# Patient Record
Sex: FEMALE | Race: WHITE | URBAN - METROPOLITAN AREA
[De-identification: names, ages, dates, MRNs, and addresses within clinical notes are randomized per-mention and may not be internally consistent; named-entity substitution may affect disease eponyms.]

---

## 2017-09-13 ENCOUNTER — HOSPITAL ENCOUNTER (INPATIENT)
Facility: CLINIC | Age: 22
LOS: 2 days | Discharge: HOME | DRG: 885 | End: 2017-09-15
Attending: PSYCHIATRY & NEUROLOGY | Admitting: PSYCHIATRY & NEUROLOGY
Payer: COMMERCIAL

## 2017-09-13 ENCOUNTER — HOSPITAL ENCOUNTER (EMERGENCY)
Facility: CLINIC | Age: 22
Discharge: NURSING, REHAB OR LONG-TERM CARE | End: 2017-09-13
Attending: EMERGENCY MEDICINE | Admitting: EMERGENCY MEDICINE
Payer: COMMERCIAL

## 2017-09-13 VITALS
DIASTOLIC BLOOD PRESSURE: 85 MMHG | SYSTOLIC BLOOD PRESSURE: 115 MMHG | RESPIRATION RATE: 18 BRPM | OXYGEN SATURATION: 99 % | TEMPERATURE: 98.4 F | WEIGHT: 150 LBS

## 2017-09-13 DIAGNOSIS — F41.9 ANXIETY: ICD-10-CM

## 2017-09-13 DIAGNOSIS — R45.851 SUICIDAL IDEATION: ICD-10-CM

## 2017-09-13 DIAGNOSIS — F32.A DEPRESSION, UNSPECIFIED DEPRESSION TYPE: Primary | ICD-10-CM

## 2017-09-13 LAB
ALCOHOL BREATH TEST: 0 (ref 0–0.01)
AMPHETAMINES UR QL SCN: NEGATIVE
ANION GAP SERPL CALCULATED.3IONS-SCNC: 9 MMOL/L (ref 3–14)
APAP SERPL-MCNC: <2 MG/L (ref 10–20)
BARBITURATES UR QL: NEGATIVE
BASOPHILS # BLD AUTO: 0 10E9/L (ref 0–0.2)
BASOPHILS NFR BLD AUTO: 0.4 %
BENZODIAZ UR QL: NEGATIVE
BUN SERPL-MCNC: 10 MG/DL (ref 7–30)
CALCIUM SERPL-MCNC: 9.4 MG/DL (ref 8.5–10.1)
CANNABINOIDS UR QL SCN: NEGATIVE
CHLORIDE SERPL-SCNC: 105 MMOL/L (ref 94–109)
CO2 SERPL-SCNC: 25 MMOL/L (ref 20–32)
COCAINE UR QL: NEGATIVE
CREAT SERPL-MCNC: 0.89 MG/DL (ref 0.52–1.04)
DIFFERENTIAL METHOD BLD: NORMAL
EOSINOPHIL # BLD AUTO: 0.1 10E9/L (ref 0–0.7)
EOSINOPHIL NFR BLD AUTO: 0.9 %
ERYTHROCYTE [DISTWIDTH] IN BLOOD BY AUTOMATED COUNT: 13.8 % (ref 10–15)
GFR SERPL CREATININE-BSD FRML MDRD: 78 ML/MIN/1.7M2
GLUCOSE SERPL-MCNC: 89 MG/DL (ref 70–99)
HCG UR QL: NEGATIVE
HCT VFR BLD AUTO: 44.2 % (ref 35–47)
HGB BLD-MCNC: 14.7 G/DL (ref 11.7–15.7)
IMM GRANULOCYTES # BLD: 0 10E9/L (ref 0–0.4)
IMM GRANULOCYTES NFR BLD: 0.1 %
LYMPHOCYTES # BLD AUTO: 2.9 10E9/L (ref 0.8–5.3)
LYMPHOCYTES NFR BLD AUTO: 38 %
MCH RBC QN AUTO: 29.9 PG (ref 26.5–33)
MCHC RBC AUTO-ENTMCNC: 33.3 G/DL (ref 31.5–36.5)
MCV RBC AUTO: 90 FL (ref 78–100)
MONOCYTES # BLD AUTO: 0.7 10E9/L (ref 0–1.3)
MONOCYTES NFR BLD AUTO: 8.5 %
NEUTROPHILS # BLD AUTO: 4 10E9/L (ref 1.6–8.3)
NEUTROPHILS NFR BLD AUTO: 52.1 %
NRBC # BLD AUTO: 0 10*3/UL
NRBC BLD AUTO-RTO: 0 /100
OPIATES UR QL SCN: NEGATIVE
PCP UR QL SCN: NEGATIVE
PLATELET # BLD AUTO: 376 10E9/L (ref 150–450)
POTASSIUM SERPL-SCNC: 3.8 MMOL/L (ref 3.4–5.3)
RBC # BLD AUTO: 4.92 10E12/L (ref 3.8–5.2)
SALICYLATES SERPL-MCNC: <2 MG/DL
SODIUM SERPL-SCNC: 139 MMOL/L (ref 133–144)
WBC # BLD AUTO: 7.6 10E9/L (ref 4–11)

## 2017-09-13 PROCEDURE — 81025 URINE PREGNANCY TEST: CPT | Performed by: EMERGENCY MEDICINE

## 2017-09-13 PROCEDURE — 80307 DRUG TEST PRSMV CHEM ANLYZR: CPT | Performed by: EMERGENCY MEDICINE

## 2017-09-13 PROCEDURE — 12400002 ZZH R&B MH SENIOR/ADOLESCENT

## 2017-09-13 PROCEDURE — 80329 ANALGESICS NON-OPIOID 1 OR 2: CPT | Performed by: EMERGENCY MEDICINE

## 2017-09-13 PROCEDURE — 80048 BASIC METABOLIC PNL TOTAL CA: CPT | Performed by: EMERGENCY MEDICINE

## 2017-09-13 PROCEDURE — 82075 ASSAY OF BREATH ETHANOL: CPT

## 2017-09-13 PROCEDURE — 99285 EMERGENCY DEPT VISIT HI MDM: CPT | Mod: 25

## 2017-09-13 PROCEDURE — 85025 COMPLETE CBC W/AUTO DIFF WBC: CPT | Performed by: EMERGENCY MEDICINE

## 2017-09-13 PROCEDURE — 90791 PSYCH DIAGNOSTIC EVALUATION: CPT

## 2017-09-13 RX ORDER — FERROUS SULFATE 325(65) MG
TABLET ORAL
COMMUNITY

## 2017-09-13 RX ORDER — ACETAMINOPHEN 325 MG/1
650 TABLET ORAL EVERY 4 HOURS PRN
Status: DISCONTINUED | OUTPATIENT
Start: 2017-09-13 | End: 2017-09-15 | Stop reason: HOSPADM

## 2017-09-13 RX ORDER — MULTIPLE VITAMINS W/ MINERALS TAB 9MG-400MCG
1 TAB ORAL DAILY
COMMUNITY

## 2017-09-13 RX ORDER — ALUMINA, MAGNESIA, AND SIMETHICONE 2400; 2400; 240 MG/30ML; MG/30ML; MG/30ML
30 SUSPENSION ORAL EVERY 4 HOURS PRN
Status: DISCONTINUED | OUTPATIENT
Start: 2017-09-13 | End: 2017-09-15 | Stop reason: HOSPADM

## 2017-09-13 RX ORDER — BISACODYL 10 MG
10 SUPPOSITORY, RECTAL RECTAL DAILY PRN
Status: DISCONTINUED | OUTPATIENT
Start: 2017-09-13 | End: 2017-09-15 | Stop reason: HOSPADM

## 2017-09-13 RX ORDER — HYDROXYZINE HYDROCHLORIDE 25 MG/1
25-50 TABLET, FILM COATED ORAL EVERY 4 HOURS PRN
Status: DISCONTINUED | OUTPATIENT
Start: 2017-09-13 | End: 2017-09-15 | Stop reason: HOSPADM

## 2017-09-13 RX ORDER — OLANZAPINE 10 MG/2ML
5-10 INJECTION, POWDER, FOR SOLUTION INTRAMUSCULAR
Status: DISCONTINUED | OUTPATIENT
Start: 2017-09-13 | End: 2017-09-15 | Stop reason: HOSPADM

## 2017-09-13 RX ORDER — LEVONORGESTREL/ETHIN.ESTRADIOL 0.1-0.02MG
1 TABLET ORAL DAILY
COMMUNITY

## 2017-09-13 RX ORDER — TRAZODONE HYDROCHLORIDE 50 MG/1
50 TABLET, FILM COATED ORAL
Status: DISCONTINUED | OUTPATIENT
Start: 2017-09-13 | End: 2017-09-15 | Stop reason: HOSPADM

## 2017-09-13 RX ORDER — OLANZAPINE 5 MG/1
5-10 TABLET ORAL
Status: DISCONTINUED | OUTPATIENT
Start: 2017-09-13 | End: 2017-09-15 | Stop reason: HOSPADM

## 2017-09-13 ASSESSMENT — ENCOUNTER SYMPTOMS
NERVOUS/ANXIOUS: 1
HALLUCINATIONS: 0

## 2017-09-13 NOTE — IP AVS SNAPSHOT
MRN:[id number]                      After Visit Summary   9/13/2017    Brandi Jimenez    MRN: [id number]           Thank you!     Thank you for choosing [facility] for your care. Our goal is always to provide you with excellent care.        Patient Information     Date Of Birth          1995        Designated Caregiver       Most Recent Value    Caregiver    Will someone help with your care after discharge? no      About your hospital stay     You were admitted on:  September 13, 2017 You last received care in the:  Child Adolescent  Inpatient Unit    You were discharged on:  September 15, 2017       Who to Call     For medical emergencies, please call 911.  For non-urgent questions about your medical care, please call your primary care provider or clinic, None          Attending Provider     Provider Specialty    Harshil Mcwililams MD Psychiatry       Primary Care Provider    None Specified      Further instructions from your care team             Behavioral Discharge Planning and Instructions      Summary: You were admitted on 9/13/2017 to [facility] for suicidal ideation.  You were treated by Debra Naegele, APRN, CNP and discharged on 09/15/17  to Home    Health Care Follow-up Appointments:   Medication Management  As discussed, you will schedule a follow-up appointment with Dr. Villalobos for medication management. We recommend you see your provider within 7 days of discharge  Phone:  [phone]  [facility] has faxed the Discharge Summary and AVS to this provider at Fax: [phone]      Therapy Appointment   Per your current therapist you are referred to Betina Mariano for continued therapy. Please call [facility] to schedule an appointment.   Provider: Cee Mariano  Phone:[phone]      DBT Intake Appointment  Date: Wednesday 9/27/17  Time: 1pm      Provider: [place]. [address], [address] [place] [address]  Phone: [phone]  [facility] has  faxed the discharge summary and avs to this provider at Fax: [phone]     Bring your ID, insurance cards and medication list to your appointment.     Attend all scheduled appointments with your outpatient providers. Call at least 24 hours in advance if you need to reschedule an appointment to ensure continued access to your outpatient providers.   Major Treatments, Procedures and Findings: You were provided with: a psychiatric assessment, assessed for medical stability, medication evaluation and/or management, group therapy and milieu management    Symptoms to Report: feeling more aggressive, increased confusion, losing more sleep, mood getting worse or thoughts of suicide    Early warning signs can include:  increased depression or anxiety sleep disturbances increased thoughts or behaviors of suicide or self-harm     Safety and Wellness:  Take all medicines as directed.  Make no changes unless your doctor suggests them.      Follow treatment recommendations.  Refrain from alcohol and non-prescribed drugs.  If there is a concern for safety, call 911.    Resources: Mental health crisis response for your [facility] is offered 24 hours a day, 7 days a week. A trained counselor will assess your current situation, offer support and counseling and connect you with local resources. Please call  [facility] Crisis (COPE) Response - Adult [phone]    The treatment team has appreciated the opportunity to work with you. Brandi, please take care and make your recovery a daily recovery. If you have any questions or concerns our unit number is [phone].  You will be receiving a follow-up phone call within the next three days from a representative from behavioral health.  You have identified the best phone number to reach you as [phone]          Pending Results     No orders found from 9/11/2017 to 9/14/2017.            Admission Information     Date & Time Department Dept. Phone    9/13/2017 Child Adolescent   "Inpatient Unit [phone]      Your Vitals Were     Blood Pressure Pulse Temperature Respirations Height Weight    109/79 69 100.3  F (37.9  C) (Oral) 16 1.676 m (5' 6\") 67.6 kg (149 lb 0.5 oz)    BMI (Body Mass Index)                   24.05 kg/m2           [employer] Information     [employer] lets you send messages to your doctor, view your test results, renew your prescriptions, schedule appointments and more. To sign up, go to [email or web address] . Click on \"Log in\" on the left side of the screen, which will take you to the Welcome page. Then click on \"Sign up Now\" on the right side of the page.     You will be asked to enter the access code listed below, as well as some personal information. Please follow the directions to create your username and password.     Your access code is: [email or web address]  Expires: 2017  4:31 PM     Your access code will  in 90 days. If you need help or a new code, please call your [facility] clinic or [phone].        Care EveryWhere ID     This is your Care EveryWhere ID. This could be used by other organizations to access your [facility] medical records  [id number]        Equal Access to Services     JOEL CEJA : Tamika richardso Soyessica, waaxda luqadaha, qaybta kaalmada adeegyada, bonita lin. So [facility] [phone].    ATENCIÓN: Si habla español, tiene a robbins disposición servicios gratuitos de asistencia lingüística. Llame al [phone].    We comply with applicable federal civil rights laws and [place] laws. We do not discriminate on the basis of race, color, national origin, age, disability sex, sexual orientation or gender identity.               Review of your medicines      START taking        Dose / Directions    propranolol 10 MG tablet   Commonly known as:  INDERAL   Used for:  Anxiety        Dose:  10 mg   Start taking on:  2017   Take 1 tablet (10 mg) by mouth At Bedtime   Quantity:  30 tablet   Refills:  1       " sertraline 25 MG tablet   Commonly known as:  ZOLOFT   Used for:  Depression, unspecified depression type        Dose:  25 mg   Take 1 tablet (25 mg) by mouth daily   Quantity:  30 tablet   Refills:  1         CONTINUE these medicines which have NOT CHANGED        Dose / Directions    ferrous sulfate 325 (65 FE) MG tablet   Commonly known as:  IRON        Take by mouth daily (with breakfast)   Refills:  0       levonorgestrel-ethinyl estradiol 0.1-20 MG-MCG per tablet   Commonly known as:  AVIANE,ALESSE,LESSINA        Dose:  1 tablet   Take 1 tablet by mouth daily   Refills:  0       multivitamin, therapeutic with minerals Tabs tablet        Dose:  1 tablet   Take 1 tablet by mouth daily   Refills:  0       VITAMIN D (CHOLECALCIFEROL) PO        Dose:  2000 Units   Take 2,000 Units by mouth daily   Refills:  0            Where to get your medicines      These medications were sent to [facility] [address]  [address] [address]     Phone:  [phone]     propranolol 10 MG tablet    sertraline 25 MG tablet                Protect others around you: Learn how to safely use, store and throw away your medicines at www.disposemymeds.org.             Medication List: This is a list of all your medications and when to take them. Check marks below indicate your daily home schedule. Keep this list as a reference.      Medications           Morning Afternoon Evening Bedtime As Needed    ferrous sulfate 325 (65 FE) MG tablet   Commonly known as:  IRON   Take by mouth daily (with breakfast)                                levonorgestrel-ethinyl estradiol 0.1-20 MG-MCG per tablet   Commonly known as:  AVIANE,ALESSE,LESSINA   Take 1 tablet by mouth daily                                multivitamin, therapeutic with minerals Tabs tablet   Take 1 tablet by mouth daily                                propranolol 10 MG tablet   Commonly known as:  INDERAL   Take 1 tablet  (10 mg) by mouth At Bedtime   Start taking on:  9/16/2017   Last time this was given:  10 mg on 9/15/2017  9:02 AM                                sertraline 25 MG tablet   Commonly known as:  ZOLOFT   Take 1 tablet (25 mg) by mouth daily   Last time this was given:  25 mg on 9/15/2017  9:01 AM                                VITAMIN D (CHOLECALCIFEROL) PO   Take 2,000 Units by mouth daily

## 2017-09-13 NOTE — PHARMACY-ADMISSION MEDICATION HISTORY
Admission medication history interview status for the 9/13/2017  admission is complete. See EPIC admission navigator for prior to admission medications     Medication history source reliability:Good    Actions taken by pharmacist (provider contacted, etc):None     Additional medication history information not noted on PTA med list :None    Medication reconciliation/reorder completed by provider prior to medication history? No    Time spent in this activity: 20min    Prior to Admission medications    Medication Sig Last Dose Taking? Auth Provider   levonorgestrel-ethinyl estradiol (AVIANE,ALESSE,LESSINA) 0.1-20 MG-MCG per tablet Take 1 tablet by mouth daily 9/12/2017 at Unknown time Yes Unknown, Entered By History   multivitamin, therapeutic with minerals (THERA-VIT-M) TABS tablet Take 1 tablet by mouth daily 9/13/2017 at Unknown time Yes Unknown, Entered By History   VITAMIN D, CHOLECALCIFEROL, PO Take 2,000 Units by mouth daily 9/13/2017 at Unknown time Yes Unknown, Entered By History   ferrous sulfate (IRON) 325 (65 FE) MG tablet Take by mouth daily (with breakfast) 9/13/2017 at Unknown time Yes Unknown, Entered By History

## 2017-09-13 NOTE — ED PROVIDER NOTES
History     Chief Complaint:  Suicidal ideation    HPI   Brandi Jimenez is a 22 year old female who presents with suicidal ideation. The patient reports that she has been experiencing a great deal of stress recently with conflicts with friends, work trouble, roommate concerns, family illness, and losing her support system when she moved here from [place] a year and a half ago. Due to these stressors, her depression has been increasingly difficult for her and she has felt like she has no one she can relate to the last 3 weeks. She reports that she has been cutting herself for the last 2 weeks as well as having thoughts of suicide. She planned on sitting in her car with it running in order to end her life recently and is still having thoughts of doing this while here in the ED. The patient mentioned all this to her therapist earlier that she regularly sees earlier today who recombined she come here for admission. She has been on Prozac in the past, which she stopped taking as she felt this did not help her. She does have a history of a past suicide attempt by overdosing on her Prozac while in college.    Allergies:  No Known Drug Allergies     Medications:    The patient is not currently taking any prescribed medications.    Past Medical History:    The patient denies any relevant past medical history.    Past Surgical History:    History reviewed. No pertinent past surgical history.    Family History:    The patient denies any relevant family medical history.    Social History:  The patient presented to the ED alone  Marital Status:  N/A     Review of Systems   Psychiatric/Behavioral: Positive for self-injury and suicidal ideas. Negative for hallucinations. The patient is nervous/anxious.    All other systems reviewed and are negative.    Physical Exam   Vitals:  Patient Vitals for the past 24 hrs:   BP Temp Temp src Heart Rate Resp SpO2 Weight   09/13/17 1516 119/86 98.4  F (36.9  C) Oral 83 18 99 % 68 kg (150 lb)      Physical Exam  General/Appearance: appears stated age, well-groomed, appears mildly tearful  Eyes: EOMI, no scleral injection, no icterus  ENT: MMM  Neck: supple, nl ROM, no stiffness  Cardiovascular: RRR, nl S1S2, no m/r/g, 2+ pulses in all 4 extremities, cap refill <2sec  Respiratory: CTAB, good air movement throughout, no wheezes/rhonchi/rales, no increased WOB, no retractions  Back: no lesions  GI: abd soft, non-distended, nttp,  no HSM, no rebound, no guarding, nl BS  MSK: TIDWELL, good tone, no bony abnormality  Skin: warm and well-perfused, no rash, no edema, no ecchymosis, nl turgor  Neuro: GCS 15, alert and oriented, no gross focal neuro deficits  Psych: Appearance: Casually dressed, appears stated age.     Attitude: Cooperative.     Eye Contact: Fair.     Speech: Regular rate & rhythm,  low volume and tone    Psychomotor Behavior: Normal/slow/overactive    Mood: depressed    Affect: depressed    Thought Process: Intact/tangengial/flight of ideas    Thought Content: + SI. - HI. - paranoia. - hallucinations    Insight: limited    Judgment: limited    Oriented to: Person place and time.     Attention Span and Concentration: Intact     Recent and Remote Memory: Intact  Heme: no petechia, no purpura, no active bleeding    Emergency Department Course     Laboratory:  Laboratory findings were communicated with the patient who voiced understanding of the findings.  CBC: AWNL. (WBC 7.6, HGB 14.7, )   BMP: AWNL (Creatinine 0.89)  Acetaminophen level: <2  Salicylate level: <2  Drug abuse screen: Negative  Alcohol breath test (collected at 1656): 0.00    Emergency Department Course:  Nursing notes and vitals reviewed.  I performed an exam of the patient as documented above.   IV was inserted and blood was drawn for laboratory testing, results above.    I discussed the treatment plan with the patient. They expressed understanding of this plan and consented to admission. I discussed the patient with the admitting  physician from [facility], who will admit the patient to a monitored bed for further evaluation and treatment.    Impression & Plan      Medical Decision Making:  This patient is a 22-year-old female with a history of depression and previous suicidal ideation who presents today with increasing depression and active thoughts and plans to harm herself. To both DEC and she indicated a desire to try to end her life. She is willing to common to the hospital however is on health officer hold as, where she did change her mind, I don't feel she should be allowed to leave. There is no evidence that she has attempted self-harm. Acetaminophen, salicylate, anion gap are all within normal limits. There is a bed at [facility] per she will be transferred to be seen by psych.    Diagnosis:    ICD-10-CM    1. Suicidal ideation R45.851 CBC with platelets differential     Basic metabolic panel     Salicylate level     Drug abuse screen urine     Disposition:   Transfer    Scribe Disclosure:  I, Vikram Greenfield, am serving as a scribe at 4:51 PM on 9/13/2017 to document services personally performed by Narcisa Henry*, based on my observations and the provider's statements to me.'    9/13/2017    EMERGENCY DEPARTMENT       Narcisa Henry MD  09/13/17 1936

## 2017-09-14 PROCEDURE — 12400002 ZZH R&B MH SENIOR/ADOLESCENT

## 2017-09-14 PROCEDURE — 90853 GROUP PSYCHOTHERAPY: CPT

## 2017-09-14 PROCEDURE — 99222 1ST HOSP IP/OBS MODERATE 55: CPT | Mod: AI | Performed by: CLINICAL NURSE SPECIALIST

## 2017-09-14 PROCEDURE — 25000132 ZZH RX MED GY IP 250 OP 250 PS 637: Performed by: PSYCHIATRY & NEUROLOGY

## 2017-09-14 PROCEDURE — 97150 GROUP THERAPEUTIC PROCEDURES: CPT | Mod: GO

## 2017-09-14 PROCEDURE — 25000132 ZZH RX MED GY IP 250 OP 250 PS 637: Performed by: CLINICAL NURSE SPECIALIST

## 2017-09-14 RX ORDER — SERTRALINE HYDROCHLORIDE 25 MG/1
25 TABLET, FILM COATED ORAL DAILY
Status: DISCONTINUED | OUTPATIENT
Start: 2017-09-14 | End: 2017-09-15 | Stop reason: HOSPADM

## 2017-09-14 RX ADMIN — HYDROXYZINE HYDROCHLORIDE 25 MG: 25 TABLET ORAL at 18:03

## 2017-09-14 RX ADMIN — HYDROXYZINE HYDROCHLORIDE 25 MG: 25 TABLET ORAL at 10:44

## 2017-09-14 RX ADMIN — SERTRALINE HYDROCHLORIDE 25 MG: 25 TABLET ORAL at 12:48

## 2017-09-14 ASSESSMENT — ACTIVITIES OF DAILY LIVING (ADL)
DRESS: SCRUBS (BEHAVIORAL HEALTH)
ORAL_HYGIENE: INDEPENDENT
GROOMING: INDEPENDENT
LAUNDRY: WITH SUPERVISION
ORAL_HYGIENE: INDEPENDENT
HYGIENE/GROOMING: INDEPENDENT
DRESS: SCRUBS (BEHAVIORAL HEALTH);INDEPENDENT

## 2017-09-14 NOTE — ED NOTES
Report given to RN at [facility]. Patient can transfer, they would like urine pregnancy test run first.

## 2017-09-14 NOTE — PLAN OF CARE
Problem: Depressive Symptoms  Goal: Depressive Symptoms  Signs and symptoms of listed problems will be absent or manageable.    Patient, prior to discharge, will:  -verbalize decrease in depressive signs/symptoms  -verbalize a decrease in anxiety   -verbalize an understanding of medication regimen   -verbalize absence of SI/SIB   -develop a safety plan  -identify a support system   -will participate in coordination of discharge planning    To promote safety/ mental health    Patient identified the following   Triggers:    Wellness Strategies:    Warning Signs:      Feedback (people they would like to receive feedback from if early warning signs):  Friend(s)    Family(s):     Partner/Spouse:     Support Group Member(s):     Co-Worker(s):     Taking Action:    Ways to [place]:      Self-Reflection & Planning.  Assessed patient s progress completing forms related to Illness Management Recovery (including Personal Plan of Care, Adult Coping Plan, and My Support and Coping Plan) and assisted as needed.    Encouraged patient to continue to consider triggers, wellness strategies, early warning signs, feedback from others, actions to take to prevent relapse, and coping strategies as part of a plan to remain well after leaving the hospital.         Outcome: No Change    09/13/17 9877   Depressive Symptoms   Depressive Symptoms Assessed all   Depressive Symptoms Present affect;mood;anxiety;insight;thought process;speech;psychomotor activity         Patient is admitted to [facility] from [facility]. Patient and belongings are searched.  Patient is given a tour of the unit. Patient presented self to the ED with c/o worsening depression in the last three weeks.  Patient had a SI plan to commit suicide by CO2 poisoning.  Patient has a history of SA X 1 in the past.  Prescribed Prozac but discontinued the medication.  No current mental health meds.  Patient recently moved to the [place] for a job opportunity.  Patient reports  "\"issues with friends, health issues with family, lots going on at work and don't get along with roommate.\"  Patient reports isolation.  Patient support system is her mother who lives 4 hours away.  Patient denies SI or SIB now.  Reports depression 7/10, anxiety 9/10, irritability 3/10, aggitation 4/10.  Denies hallucinations or hearing voices.  Patient is hopeful with this admission and reported being here as positive. MD notified of new admit.  Orders are received.  Patient signed admit forms including a XAVI for mom.  Patient denies any questions or concerns at this time. Patient is on Status 15 for safety.  Continue to monitor.       "

## 2017-09-14 NOTE — PROGRESS NOTES
09/14/17 1300   General Information   Date Initially Attended OT 09/14/17   Clinical Impression   Affect Appropriate to situation   Orientation Oriented to person, place and time   Appearance and ADLs General cleanliness observed in most areas   Attention to Internal Stimuli No observed signs   Interaction Skills Interacts appropriately with staff   Ability to Communicate Needs [facility]   Verbal Content Appropriate to topic   Ability to Maintain Boundaries Maintains appropriate physical boundaries   Participation Initiates participation   Concentration Concentrates 50 minutes   Ability to Concentrate With structure   Follows and Comprehends Directions Independently follows 2 step verbal directions   Memory Delayed and immediate recall intact   Organization Independently organizes medium tasks   Decision Making Independent   Planning and Problem Solving Occasionally needs assist/feedback   Ability to Apply and Learn Concepts Applies within group structure   Frustrations / Stress Tolerance Independently identifies sources of frustration/stress   Level of Insight Insightful into needs, issues, goals   Self Esteem Can identify positives   Social Supports Identifies utilizing supports

## 2017-09-14 NOTE — PROGRESS NOTES
Behavioral Health  Note  Behavioral Health  Spirituality Group Note    Unit 4AW    Name: Brandi Jimenez    YOB: 1995   MRN: [id number]    Age: 22 year old    Patient attended -led group, which included discussion of spirituality, coping with illness and building resilience.  Patient attended group for 1 hrs.  The patient actively participated in group discussion    Shae Fisher M.S., M.Div.  Staff   Pager [phone]

## 2017-09-14 NOTE — PROGRESS NOTES
"Attendence: Pt. Attended scheduled 2 of 2 OT sessions today.   Observations: pt was positive and engaged throughout groups, interacting with peers and staff. Pt identified coping skills including singing and playing piano and reports wanting to gain \"more coping skills\".      09/14/17 1500   Occupational Therapy   Type of Intervention structured groups   Response Participates   Hours 2     "
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
## 2017-09-14 NOTE — PROGRESS NOTES
Pt. Stated that she was agitated but had a good day overall. Pt. Said that she  getting adjusted to how things work. Pt. Stated that she had SI/SIB thoughts but it is not anything she would act on. Pt. Did not need any redirection from staff.      09/14/17 1400   Behavioral Health   Hallucinations denies / not responding to hallucinations   Thinking poor concentration   Orientation person: oriented;place: oriented;date: oriented;time: oriented   Memory baseline memory   Insight admits / accepts   Judgement intact   Eye Contact at examiner   Affect blunted, flat   Mood mood is calm   Physical Appearance/Attire attire appropriate to age and situation   Hygiene well groomed   Suicidality thoughts only   Self Injury thoughts only   Elopement (none observed )   Activity other (see comment)  (visible in the milieu and groups )   Speech clear;coherent   Medication Sensitivity no stated side effects

## 2017-09-14 NOTE — PROGRESS NOTES
"Patient received prn hydroxyzine 25 mg at 1045 for sx of anxiety. She was also given printed information on new medication sertraline with plan to start sertraline at 25 mg daily today. She reported improvement in anxiety when rechecked after hydroxyzine given and also reported SE of being \"a little dizzy\" after medication was given. She had no questions regarding new medication sertraline.           "

## 2017-09-14 NOTE — PROGRESS NOTES
09/13/17 2154   Patient Belongings   Did you bring any home meds/supplements to the hospital?  Yes   Disposition of meds  Sent to security/pharmacy per site process   Patient Belongings clothing;jewelry;purse;shoes   Disposition of Belongings security, pt bin    Belongings Search Yes   Clothing Search Yes   Second Staff Smitha RN         Bin:  Black pants, patterned blouse, copper necklace, white iphone with blue case, black flats, black jacoby bag, gray edu, cigarettes, lighter, matches, makeup, headphones (2), car keys, , pink wallet, black wallet (insurance card, 10$ cash, birth control (Aviane))    Security ([id number]) :  [employer] Reviewed ID, [place] 's License, [employer] card ([id number])        A               Admission:  I am responsible for any personal items that are not sent to the safe or pharmacy.  [facility] is not responsible for loss, theft or damage of any property in my possession.    Signature:  _________________________________ Date: _______  Time: _____                                              Staff Signature:  ____________________________ Date: ________  Time: _____      2nd Staff person, if patient is unable/unwilling to sign:    Signature: ________________________________ Date: ________  Time: _____     Discharge:  [facility] has returned all of my personal belongings:    Signature: _________________________________ Date: ________  Time: _____                                          Staff Signature:  ____________________________ Date: ________  Time: _____

## 2017-09-15 VITALS
HEIGHT: 66 IN | BODY MASS INDEX: 23.95 KG/M2 | WEIGHT: 149.03 LBS | TEMPERATURE: 100.3 F | DIASTOLIC BLOOD PRESSURE: 79 MMHG | SYSTOLIC BLOOD PRESSURE: 109 MMHG | HEART RATE: 69 BPM | RESPIRATION RATE: 16 BRPM

## 2017-09-15 LAB
ALBUMIN SERPL-MCNC: 3.5 G/DL (ref 3.4–5)
ALP SERPL-CCNC: 35 U/L (ref 40–150)
ALT SERPL W P-5'-P-CCNC: 20 U/L (ref 0–50)
ANION GAP SERPL CALCULATED.3IONS-SCNC: 9 MMOL/L (ref 3–14)
AST SERPL W P-5'-P-CCNC: 11 U/L (ref 0–45)
BASOPHILS # BLD AUTO: 0 10E9/L (ref 0–0.2)
BASOPHILS NFR BLD AUTO: 0.5 %
BILIRUB SERPL-MCNC: 0.4 MG/DL (ref 0.2–1.3)
BUN SERPL-MCNC: 11 MG/DL (ref 7–30)
CALCIUM SERPL-MCNC: 8.5 MG/DL (ref 8.5–10.1)
CHLORIDE SERPL-SCNC: 109 MMOL/L (ref 94–109)
CHOLEST SERPL-MCNC: 182 MG/DL
CO2 SERPL-SCNC: 24 MMOL/L (ref 20–32)
CREAT SERPL-MCNC: 0.98 MG/DL (ref 0.52–1.04)
DEPRECATED CALCIDIOL+CALCIFEROL SERPL-MC: 48 UG/L (ref 20–75)
DIFFERENTIAL METHOD BLD: NORMAL
EOSINOPHIL # BLD AUTO: 0.2 10E9/L (ref 0–0.7)
EOSINOPHIL NFR BLD AUTO: 3 %
ERYTHROCYTE [DISTWIDTH] IN BLOOD BY AUTOMATED COUNT: 13.5 % (ref 10–15)
GFR SERPL CREATININE-BSD FRML MDRD: 70 ML/MIN/1.7M2
GLUCOSE SERPL-MCNC: 85 MG/DL (ref 70–99)
HCT VFR BLD AUTO: 41.6 % (ref 35–47)
HDLC SERPL-MCNC: 62 MG/DL
HGB BLD-MCNC: 13.1 G/DL (ref 11.7–15.7)
IMM GRANULOCYTES # BLD: 0 10E9/L (ref 0–0.4)
IMM GRANULOCYTES NFR BLD: 0 %
LDLC SERPL CALC-MCNC: 99 MG/DL
LYMPHOCYTES # BLD AUTO: 2.6 10E9/L (ref 0.8–5.3)
LYMPHOCYTES NFR BLD AUTO: 44.5 %
MCH RBC QN AUTO: 28.7 PG (ref 26.5–33)
MCHC RBC AUTO-ENTMCNC: 31.5 G/DL (ref 31.5–36.5)
MCV RBC AUTO: 91 FL (ref 78–100)
MONOCYTES # BLD AUTO: 0.5 10E9/L (ref 0–1.3)
MONOCYTES NFR BLD AUTO: 8.4 %
NEUTROPHILS # BLD AUTO: 2.5 10E9/L (ref 1.6–8.3)
NEUTROPHILS NFR BLD AUTO: 43.6 %
NONHDLC SERPL-MCNC: 120 MG/DL
NRBC # BLD AUTO: 0 10*3/UL
NRBC BLD AUTO-RTO: 0 /100
PLATELET # BLD AUTO: 322 10E9/L (ref 150–450)
POTASSIUM SERPL-SCNC: 4 MMOL/L (ref 3.4–5.3)
PROT SERPL-MCNC: 7.1 G/DL (ref 6.8–8.8)
RBC # BLD AUTO: 4.57 10E12/L (ref 3.8–5.2)
SODIUM SERPL-SCNC: 142 MMOL/L (ref 133–144)
TRIGL SERPL-MCNC: 105 MG/DL
TSH SERPL DL<=0.005 MIU/L-ACNC: 0.6 MU/L (ref 0.4–4)
WBC # BLD AUTO: 5.7 10E9/L (ref 4–11)

## 2017-09-15 PROCEDURE — 82306 VITAMIN D 25 HYDROXY: CPT | Performed by: PSYCHIATRY & NEUROLOGY

## 2017-09-15 PROCEDURE — 36415 COLL VENOUS BLD VENIPUNCTURE: CPT | Performed by: PSYCHIATRY & NEUROLOGY

## 2017-09-15 PROCEDURE — 80061 LIPID PANEL: CPT | Performed by: PSYCHIATRY & NEUROLOGY

## 2017-09-15 PROCEDURE — 84443 ASSAY THYROID STIM HORMONE: CPT | Performed by: PSYCHIATRY & NEUROLOGY

## 2017-09-15 PROCEDURE — 85025 COMPLETE CBC W/AUTO DIFF WBC: CPT | Performed by: PSYCHIATRY & NEUROLOGY

## 2017-09-15 PROCEDURE — 25000132 ZZH RX MED GY IP 250 OP 250 PS 637: Performed by: CLINICAL NURSE SPECIALIST

## 2017-09-15 PROCEDURE — 80053 COMPREHEN METABOLIC PANEL: CPT | Performed by: PSYCHIATRY & NEUROLOGY

## 2017-09-15 PROCEDURE — 99239 HOSP IP/OBS DSCHRG MGMT >30: CPT | Performed by: CLINICAL NURSE SPECIALIST

## 2017-09-15 PROCEDURE — 97150 GROUP THERAPEUTIC PROCEDURES: CPT | Mod: GO

## 2017-09-15 RX ORDER — PROPRANOLOL HYDROCHLORIDE 10 MG/1
10 TABLET ORAL ONCE
Status: COMPLETED | OUTPATIENT
Start: 2017-09-15 | End: 2017-09-15

## 2017-09-15 RX ORDER — PROPRANOLOL HYDROCHLORIDE 10 MG/1
10 TABLET ORAL AT BEDTIME
Qty: 30 TABLET | Refills: 1 | Status: SHIPPED | OUTPATIENT
Start: 2017-09-16

## 2017-09-15 RX ORDER — PROPRANOLOL HYDROCHLORIDE 10 MG/1
10 TABLET ORAL AT BEDTIME
Status: DISCONTINUED | OUTPATIENT
Start: 2017-09-16 | End: 2017-09-15 | Stop reason: HOSPADM

## 2017-09-15 RX ORDER — SERTRALINE HYDROCHLORIDE 25 MG/1
25 TABLET, FILM COATED ORAL DAILY
Qty: 30 TABLET | Refills: 1 | Status: SHIPPED | OUTPATIENT
Start: 2017-09-15

## 2017-09-15 RX ADMIN — SERTRALINE HYDROCHLORIDE 25 MG: 25 TABLET ORAL at 09:01

## 2017-09-15 RX ADMIN — PROPRANOLOL HYDROCHLORIDE 10 MG: 10 TABLET ORAL at 09:02

## 2017-09-15 ASSESSMENT — ACTIVITIES OF DAILY LIVING (ADL): GROOMING: INDEPENDENT

## 2017-09-15 NOTE — PROGRESS NOTES
Patient acknowledged understanding of discharge instructions and medications.  Waiting for her mother for transportation.

## 2017-09-15 NOTE — PLAN OF CARE
Problem: Depressive Symptoms  Goal: Depressive Symptoms  Signs and symptoms of listed problems will be absent or manageable.    Patient, prior to discharge, will:  -verbalize decrease in depressive signs/symptoms  -verbalize a decrease in anxiety   -verbalize an understanding of medication regimen   -verbalize absence of SI/SIB   -develop a safety plan  -identify a support system   -will participate in coordination of discharge planning    To promote safety/ mental health    Patient identified the following   Triggers:    Wellness Strategies:    Warning Signs:      Feedback (people they would like to receive feedback from if early warning signs):  Friend(s)    Family(s):     Partner/Spouse:     Support Group Member(s):     Co-Worker(s):     Taking Action:    Ways to [place]:      Self-Reflection & Planning.  Assessed patient s progress completing forms related to Illness Management Recovery (including Personal Plan of Care, Adult Coping Plan, and My Support and Coping Plan) and assisted as needed.    Encouraged patient to continue to consider triggers, wellness strategies, early warning signs, feedback from others, actions to take to prevent relapse, and coping strategies as part of a plan to remain well after leaving the hospital.         Outcome: Therapy, progress toward functional goals as expected  Patient up and visible this morning.  Affect is full range.  Pleasant and cooperative. Currently denies SI/SIB.  Describes her mood as anxious which she rates as 6-7 (10 worst).  States she is particularly anxious about next week when her mother goes back to [place].  Thinking is linear and organized.  Acknowledges that her support system is in [place]--and she plans to return in a couple of weeks.  Attended unit programming.

## 2017-09-15 NOTE — PROGRESS NOTES
"INITIAL PSYCHOSOCIAL ASSESSMENT    I have reviewed the chart and interviewed the patient.        PRESENTING PROBLEM  Per ED provider note, \"Brandi Jimenez is a 22 year old female who presents with suicidal ideation. The patient reports that she has been experiencing a great deal of stress recently with conflicts with friends, work trouble, roommate concerns, family illness, and losing her support system when she moved here from [place] a year and a half ago. Due to these stressors, her depression has been increasingly difficult for her and she has felt like she has no one she can relate to the last 3 weeks. She reports that she has been cutting herself for the last 2 weeks as well as having thoughts of suicide. She planned on sitting in her car with it running in order to end her life recently and is still having thoughts of doing this while here in the ED. The patient mentioned all this to her therapist earlier that she regularly sees earlier today who recombined she come here for admission. She has been on Prozac in the past, which she stopped taking as she felt this did not help her. She does have a history of a past suicide attempt by overdosing on her Prozac while in college\"    Patient admitted to station 4A for further psychiatric evaluation and stabilization.      Stressors  Patient is originally from [place], [place]. She has no social connections here and feels isolated.       Legal Status      Voluntary  Is patient under a civil commitment/legal guardian?  No    HISTORY OF MENTAL HEALTH  Diagnosis: The patient has a history of major depressive disorder   Current symptoms: depression, anxiety and suicidal thoughts  Past hospitalization: No  Past/current commitments: No  Hx of suicidal attempts: Yes: overdosed in college. She did not present to the hospital after the overdose so never hospitalized.   SIB:  Yes: cutting  Hx of Aggression: No  Current medications/med compliance: hx of anti depressants. Not taking " prior to admission.   Treatment History: Yes: individual therapy      CHEMICAL HEALTH HISTORY  Drug screen results:  No  History of CD Treatment:  No  Rule 25 needed:  Not applicable    SOCIAL HISTORY  Family description:  Patient is originally from [place], [place]. She reports her family is close and identifies her mother has very supportive. Patient is single and does not have any children.   Significant Life Events (Trauma):  NA  Major Illness:  No  Living Situation:  house  Criminal hx and Legal Issues: No  Ethnic/Cultural Issues:  The patient does not identify any ethnic or cultural issues that impact treatment.   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 Patient feels isolated.     CURRENT HEALTH CARE PROVIDERS  Psychiatrist: None  Therapist: Cee Urbina. [facility]. [phone]  Primary Care: Dr. Estevan Villalobos. [facility]. [phone]  : None    Strengths:  has some insight, has family support, is medically insured, has a stable living environment, has mental health providers in place, able to seek help when in crisis, is future oriented and is gainfully employed.    Vulnerabilities:  poor coping skills, lacks social support.       SOCIAL SERVICE ASSESSMENT/PLAN:       Patient will benefit from an increase in individual therapy to 1x weekly and DBT groups    CTC will consult with treatment team for additional treatment recommendations.     CTC will  schedule appointments with outpatient providers for follow-up post discharge.     Patient will continue to receive therapeutic support while hospitalized and is encouraged to attend therapies on the unit.       Angelina Allison [facility]  Clinical Treatment Coordinator

## 2017-09-15 NOTE — PROGRESS NOTES
Writer met with patient and discussed d/c plans. She will make her own follow-up appointments with her PCP and therapist. Writer informed her that her therapist called and gave the name of a interim therapist she can see. The clinic will reach out to patient to schedule. Pt was also scheduled for DBT intake appointment. Patient verbalized understanding.     Writer patient's relapse prevention plan. Patient verbalized understanding.  Copy of plan placed in chart.     AVS COMPLETED 09/15/17

## 2017-09-15 NOTE — DISCHARGE SUMMARY
Psychiatric Discharge Summary    Brandi Jimenez MRN# [id number]   Age: 22 year old YOB: 1995     Date of Admission:  9/13/2017  Date of Discharge:  9/15/2017  Admitting Physician:  Harshil Mcwilliams MD  Discharge Physician:  Debra A. Naegele, APRN CNS (Contact: [phone])         Event Leading to Hospitalization:   Brandi Jimenez is a 22-year-old female named Orion Weller who has moved to [place] from [place], [place].  She has been here for about a year and a half.  Patient reports that she has been having increased stressors including conflict with friends, trouble with work, roommate concerns, family illness.  She found out her cousin has ovarian cancer.  Patient reports that she has started cutting in the last 2 weeks.  She has a plan for killing herself of using carbon monoxide poisoning.  Patient does have a history of 1 prior overdose attempt on Prozac, which she was not hospitalized for.            See Admission note by Debra Naegele APRN, CNS on 9/14/2017 for additional details.          DIagnoses:   1.  Major depressive disorder, recurrent, severe.   2.  Cluster B personality traits.   3.  Posttraumatic stress disorder.   4.  General anxiety disorder.            Labs:     Results for orders placed or performed during the hospital encounter of 09/13/17   CBC with platelets differential   Result Value Ref Range    WBC 5.7 4.0 - 11.0 10e9/L    RBC Count 4.57 3.8 - 5.2 10e12/L    Hemoglobin 13.1 11.7 - 15.7 g/dL    Hematocrit 41.6 35.0 - 47.0 %    MCV 91 78 - 100 fl    MCH 28.7 26.5 - 33.0 pg    MCHC 31.5 31.5 - 36.5 g/dL    RDW 13.5 10.0 - 15.0 %    Platelet Count 322 150 - 450 10e9/L    Diff Method Automated Method     % Neutrophils 43.6 %    % Lymphocytes 44.5 %    % Monocytes 8.4 %    % Eosinophils 3.0 %    % Basophils 0.5 %    % Immature Granulocytes 0.0 %    Nucleated RBCs 0 0 /100    Absolute Neutrophil 2.5 1.6 - 8.3 10e9/L    Absolute Lymphocytes 2.6 0.8 - 5.3 10e9/L    Absolute  Monocytes 0.5 0.0 - 1.3 10e9/L    Absolute Eosinophils 0.2 0.0 - 0.7 10e9/L    Absolute Basophils 0.0 0.0 - 0.2 10e9/L    Abs Immature Granulocytes 0.0 0 - 0.4 10e9/L    Absolute Nucleated RBC 0.0    Comprehensive metabolic panel   Result Value Ref Range    Sodium 142 133 - 144 mmol/L    Potassium 4.0 3.4 - 5.3 mmol/L    Chloride 109 94 - 109 mmol/L    Carbon Dioxide 24 20 - 32 mmol/L    Anion Gap 9 3 - 14 mmol/L    Glucose 85 70 - 99 mg/dL    Urea Nitrogen 11 7 - 30 mg/dL    Creatinine 0.98 0.52 - 1.04 mg/dL    GFR Estimate 70 >60 mL/min/1.7m2    GFR Estimate If Black 85 >60 mL/min/1.7m2    Calcium 8.5 8.5 - 10.1 mg/dL    Bilirubin Total 0.4 0.2 - 1.3 mg/dL    Albumin 3.5 3.4 - 5.0 g/dL    Protein Total 7.1 6.8 - 8.8 g/dL    Alkaline Phosphatase 35 (L) 40 - 150 U/L    ALT 20 0 - 50 U/L    AST 11 0 - 45 U/L   TSH with free T4 reflex and/or T3 as indicated   Result Value Ref Range    TSH 0.60 0.40 - 4.00 mU/L   Lipid panel   Result Value Ref Range    Cholesterol 182 <200 mg/dL    Triglycerides 105 <150 mg/dL    HDL Cholesterol 62 >49 mg/dL    LDL Cholesterol Calculated 99 <100 mg/dL    Non HDL Cholesterol 120 <130 mg/dL            Consults:   No consultations this admission         Hospital Course:   Brandi Jimenez was admitted to [facility] currently residing on  with attending Harshil Mcwilliams MD as a voluntary patient. The patient was placed under status 15 (15 minute checks) to ensure patient safety.     Patient was admitted for suicidal ideation. She was not taking any medication for depression. She started on Sertraline 25 mg to address her depression and anxiety. She trialed hydroxyzine for anxiety and become very sedated. She trialed propranolol for anxiety and felt much better on it. Recommendation is that propranolol is titrated to 10 mg BID if needed for anxiety.     Brandi Jimenez did participate in groups and was visible in the milieu. The patient's symptoms of suicidal ideation improved. She  presents with a stable mood and denies any suicidal ideation. She was cooperative with her treatment plan. She was interested in DBT therapy to address her intense emotions and interpersonal conflicts. She has protective factors of supportive mother, job and therapist. Patient plans on moving back to [facility] where she feels she has more support.     She does not meet criteria for hospitalization. She is at low risk of relapse.     Brandi Jimenez was released to home. At the time of discharge Brandi Jimenez was determined to not be a danger to herself or others.          Discharge Medications:     Current Discharge Medication List      START taking these medications    Details   sertraline (ZOLOFT) 25 MG tablet Take 1 tablet (25 mg) by mouth daily  Qty: 30 tablet, Refills: 1    Associated Diagnoses: Depression, unspecified depression type      propranolol (INDERAL) 10 MG tablet Take 1 tablet (10 mg) by mouth At Bedtime  Qty: 30 tablet, Refills: 1    Associated Diagnoses: Anxiety         CONTINUE these medications which have NOT CHANGED    Details   levonorgestrel-ethinyl estradiol (AVIANE,ALESSE,LESSINA) 0.1-20 MG-MCG per tablet Take 1 tablet by mouth daily      multivitamin, therapeutic with minerals (THERA-VIT-M) TABS tablet Take 1 tablet by mouth daily      VITAMIN D, CHOLECALCIFEROL, PO Take 2,000 Units by mouth daily      ferrous sulfate (IRON) 325 (65 FE) MG tablet Take by mouth daily (with breakfast)                  Psychiatric Examination:   Appearance:  awake, alert and adequately groomed  Attitude:  cooperative  Eye Contact:  good  Mood:  good  Affect:  appropriate and in normal range  Speech:  clear, coherent  Psychomotor Behavior:  no evidence of tardive dyskinesia, dystonia, or tics  Thought Process:  logical, linear and goal oriented  Associations:  no loose associations  Thought Content:  no evidence of suicidal ideation or homicidal ideation  Insight:  good  Judgment:  intact  Oriented to:   time, person, and place  Attention Span and Concentration:  intact  Recent and Remote Memory:  intact  Language: Able to name objects, Able to repeat phrases and Able to read and write  Fund of Knowledge: adequate  Muscle Strength and Tone: normal  Gait and Station: Normal         Discharge Plan:   Summary: You were admitted on 9/13/2017 to [facility] for suicidal ideation.  You were treated by Debra Naegele, APRN, CNP and discharged on 09/15/17  to Home     Health Care Follow-up Appointments:   Medication Management  As discussed, you will schedule a follow-up appointment with Dr. Vilallobos for medication management. We recommend you see your provider within 7 days of discharge  Phone:  [phone]  [facility] has faxed the Discharge Summary and AVS to this provider at Fax: [phone]       Therapy Appointment   Per your current therapist you are referred to Betina Mraiano for continued therapy. Please call [facility] to schedule an appointment.   Provider: Cee Mariano  Phone:[phone]       DBT Intake Appointment  Date: Wednesday 9/27/17  Time: 1pm      Provider: [email or web address]. [address], [address] [place] [address]  Phone: [phone]  [facility] has faxed the discharge summary and avs to this provider at Fax: [phone]      Bring your ID, insurance cards and medication list to your appointment.      Attend all scheduled appointments with your outpatient providers. Call at least 24 hours in advance if you need to reschedule an appointment to ensure continued access to your outpatient providers.   Major Treatments, Procedures and Findings: You were provided with: a psychiatric assessment, assessed for medical stability, medication evaluation and/or management, group therapy and milieu management     Symptoms to Report: feeling more aggressive, increased confusion, losing more sleep, mood getting worse or thoughts of suicide     Early warning signs can include:  increased  depression or anxiety sleep disturbances increased thoughts or behaviors of suicide or self-harm      Safety and Wellness:  Take all medicines as directed.  Make no changes unless your doctor suggests them.      Follow treatment recommendations.  Refrain from alcohol and non-prescribed drugs.  If there is a concern for safety, call 911.     Resources: Mental health crisis response for your Community Health is offered 24 hours a day, 7 days a week. A trained counselor will assess your current situation, offer support and counseling and connect you with local resources. Please call  [facility] Crisis (COPE) Response - Adult [phone]     The treatment team has appreciated the opportunity to work with you. Brandi, please take care and make your recovery a daily recovery. If you have any questions or concerns our unit number is [phone].  You will be receiving a follow-up phone call within the next three days from a representative from behavioral health.  You have identified the best phone number to reach you as [phone]   Attestation:  The patient has been seen and evaluated by me,  Debra A. Naegele, APRN CNS on 9/15/2017  Discharge summary time > 30 minutes

## 2017-09-15 NOTE — PROGRESS NOTES
"Attendence: Pt. Attended scheduled 1 of 3 OT sessions today.   Observations: pt had positive participation and contributed to group discussion with insight. Pt reported interest in learning more \"coping mechanisms\" pt reported appreciating learning about mindfulness and edu on coping skills with handouts provided by therapist. Pt was motivated to participate in all groups, though was frequently called to meetings by staff and unable to participate in all groups.     09/15/17 1600   Occupational Therapy   Type of Intervention structured groups   Response Participates   Hours 1         "

## 2017-09-15 NOTE — DISCHARGE INSTRUCTIONS
Behavioral Discharge Planning and Instructions      Summary: You were admitted on 9/13/2017 to [facility] for suicidal ideation.  You were treated by Debra Naegele, APRN, CNP and discharged on 09/15/17  to Home    Health Care Follow-up Appointments:   Medication Management  As discussed, you will schedule a follow-up appointment with Dr. Villalobos for medication management. We recommend you see your provider within 7 days of discharge  Phone:  [phone]  [facility] has faxed the Discharge Summary and AVS to this provider at Fax: [phone]      Therapy Appointment   Per your current therapist you are referred to Betina Mariano for continued therapy. Please call [facility] to schedule an appointment.   Provider: Cee Mariano  Phone:[phone]      DBT Intake Appointment  Date: Wednesday 9/27/17  Time: 1pm      Provider: [employer]. [address], [address] [place] [address]  Phone: [phone]  [facility] has faxed the discharge summary and avs to this provider at Fax: [phone]     Bring your ID, insurance cards and medication list to your appointment.     Attend all scheduled appointments with your outpatient providers. Call at least 24 hours in advance if you need to reschedule an appointment to ensure continued access to your outpatient providers.   Major Treatments, Procedures and Findings: You were provided with: a psychiatric assessment, assessed for medical stability, medication evaluation and/or management, group therapy and milieu management    Symptoms to Report: feeling more aggressive, increased confusion, losing more sleep, mood getting worse or thoughts of suicide    Early warning signs can include:  increased depression or anxiety sleep disturbances increased thoughts or behaviors of suicide or self-harm     Safety and Wellness:  Take all medicines as directed.  Make no changes unless your doctor suggests them.      Follow treatment recommendations.   Refrain from alcohol and non-prescribed drugs.  If there is a concern for safety, call 911.    Resources: Mental health crisis response for your county is offered 24 hours a day, 7 days a week. A trained counselor will assess your current situation, offer support and counseling and connect you with local resources. Please call  [facility] Crisis (COPE) Response - Adult [phone]    The treatment team has appreciated the opportunity to work with you. Brandi, please take care and make your recovery a daily recovery. If you have any questions or concerns our unit number is [phone].  You will be receiving a follow-up phone call within the next three days from a representative from behavioral health.  You have identified the best phone number to reach you as [phone]

## 2017-09-15 NOTE — PROGRESS NOTES
Behavioral Team Discussion: (9/15/2017)    Continued Stay Criteria/Rationale: Patient admitted for Depression and Anxiety.    Plan: Patient will discharge home today.     Participants: 4A Provider: Debra Naegele, APRN, CNS; 4A RN's: Blanquita Lancaster RN; 4A CTC's: Angelina Allison (CTC);.    Summary/Recommendation: Patient admitted for depression and suicidal ideation. Patient to discharge home today with recommendations to increase therapy to 1x weekly and start DBT.     Medical/Physical: See medical notes.     Progress: IMPROVING

## 2017-09-18 ENCOUNTER — TELEPHONE (OUTPATIENT)
Dept: BEHAVIORAL HEALTH | Facility: CLINIC | Age: 22
End: 2017-09-18

## 2017-09-18 NOTE — TELEPHONE ENCOUNTER
"----- Message from Maria L Dumont sent at 9/16/2017  7:40 AM CDT -----  Regarding: Appt needed  We need a [facility] appt for 915/17 please - Pt was on station 4AW - epic department # [id number]     #Please add billing indicator of \"89\" (hold for claim review).     Thanks     A   "

## 2021-10-15 NOTE — PROGRESS NOTES
Brandi's goal for the evening was to get more oriented to the way things work on the unit. She rated her anxiety at 5 out of 10. She denies SI, but endorsed SIB urges. Brandi attempted to use coping skills to mitigate her SIB urges and decided to take a PRN medication to help her relax. She felt that the Zyprexa she took in the morning caused too much sedation. She stated she slept for 2-3 hours and didn't want to do that. She fell asleep in the milieu about an hour after taking her Zyprexa this evening, and was redirected to her bed.    She would like help from her CTC tomorrow to communicate with her employer.    Appetite: Good  Sleep: Good  Pain: N/A  SE's: Lethargy         09/14/17 2200   Behavioral Health   Hallucinations denies / not responding to hallucinations   Thinking poor concentration   Orientation person: oriented;place: oriented   Memory baseline memory   Insight admits / accepts   Judgement intact   Eye Contact at examiner   Affect blunted, flat   Mood anxious   Physical Appearance/Attire attire appropriate to age and situation   Hygiene well groomed   Suicidality other (see comments)  (denies)   Self Injury urges   Elopement (no current concerns)   Activity other (see comment)  (visible)   Speech clear;coherent   Medication Sensitivity sedation   Psychomotor / Gait balanced;steady   Activities of Daily Living   Hygiene/Grooming independent   Oral Hygiene independent   Dress scrubs (behavioral health)   Room Organization independent   Behavioral Health Interventions   Depression build upon strengths;assist with developing and utilizing healthy coping strategies;establish therapeutic relationship;provide emotional support;encourage participation / independence with adls;assist patient in following safety plan;assist patient in developing safety plan;maintain safe secure environment;maintain safety precautions;assess patient response to medication   Social and Therapeutic Interventions (Depression)  encourage socialization with peers;encourage effective boundaries with peers;encourage participation in therapeutic groups and milieu activities      Patient/Caregiver provided printed discharge information.